# Patient Record
Sex: MALE | Race: WHITE | NOT HISPANIC OR LATINO | Employment: FULL TIME | URBAN - METROPOLITAN AREA
[De-identification: names, ages, dates, MRNs, and addresses within clinical notes are randomized per-mention and may not be internally consistent; named-entity substitution may affect disease eponyms.]

---

## 2019-03-22 ENCOUNTER — TRANSCRIBE ORDERS (OUTPATIENT)
Dept: ADMINISTRATIVE | Facility: HOSPITAL | Age: 59
End: 2019-03-22

## 2019-03-22 DIAGNOSIS — M47.817 LUMBOSACRAL SPONDYLOSIS WITHOUT MYELOPATHY: Primary | ICD-10-CM

## 2019-04-02 ENCOUNTER — HOSPITAL ENCOUNTER (OUTPATIENT)
Dept: MRI IMAGING | Facility: HOSPITAL | Age: 59
Discharge: HOME | End: 2019-04-02
Payer: COMMERCIAL

## 2019-04-02 DIAGNOSIS — M47.817 LUMBOSACRAL SPONDYLOSIS WITHOUT MYELOPATHY: ICD-10-CM

## 2019-04-02 PROCEDURE — 72148 MRI LUMBAR SPINE W/O DYE: CPT

## 2021-03-10 DIAGNOSIS — Z23 ENCOUNTER FOR IMMUNIZATION: ICD-10-CM

## 2022-09-23 PROBLEM — K62.5 RECTAL BLEEDING: Status: ACTIVE | Noted: 2022-09-23

## 2022-11-10 ENCOUNTER — HOSPITAL ENCOUNTER (OUTPATIENT)
Dept: ULTRASOUND IMAGING | Facility: HOSPITAL | Age: 62
End: 2022-11-10

## 2022-11-10 DIAGNOSIS — R19.00 ABDOMINAL MASS, UNSPECIFIED ABDOMINAL LOCATION: ICD-10-CM

## 2024-04-14 PROBLEM — G89.29 CHRONIC LOW BACK PAIN: Status: ACTIVE | Noted: 2018-04-02

## 2024-04-14 PROBLEM — M16.11 PRIMARY OSTEOARTHRITIS OF RIGHT HIP: Status: ACTIVE | Noted: 2020-09-25

## 2024-04-14 PROBLEM — R00.1 BRADYCARDIA: Status: ACTIVE | Noted: 2023-11-13

## 2024-04-14 PROBLEM — M48.061 SPINAL STENOSIS OF LUMBAR REGION: Status: ACTIVE | Noted: 2020-09-25

## 2024-04-14 PROBLEM — M19.90 ARTHRITIS: Status: ACTIVE | Noted: 2023-11-13

## 2024-04-14 PROBLEM — C61 MALIGNANT NEOPLASM OF PROSTATE (HCC): Status: ACTIVE | Noted: 2023-01-26

## 2024-04-14 PROBLEM — E78.1 HYPERTRIGLYCERIDEMIA: Status: ACTIVE | Noted: 2023-11-14

## 2024-04-14 PROBLEM — K21.9 CHRONIC GERD: Status: ACTIVE | Noted: 2024-01-23

## 2024-04-14 PROBLEM — H91.93 BILATERAL HEARING LOSS: Status: ACTIVE | Noted: 2023-11-14

## 2024-04-14 PROBLEM — M54.50 CHRONIC LOW BACK PAIN: Status: ACTIVE | Noted: 2018-04-02

## 2024-04-14 NOTE — PROGRESS NOTES
Cardio-Oncology / Heart Failure Cardiology Clinic Note    Jakub Molina 64 y.o. male   MRN: [id number]  Encounter: [id number]        Assessment / Plan:    # Cardio-Oncology Pertinent History  Prostate cancer  Dx 2022.  S/p brachytherapy and proton beam radiation  Previously on casodex  On lupron - plan for 2 years  (but pt choose to hold off last injection due to high BP)    # HTN  Appeared to get worse on lupron  BP today 140/80    Norvasc 5 QD    Losartan 50 BID    Toprol 75 QD -->  change to coreg 12.5 mg BID  Minimize NSAIDS  Low salt diet  Exercise  Weight loss    # HLD  Hypertriglyceridemia is on prob list  On gemfibrozil  Lipids 2022 -  .    TG 98.  ? Statin based on ASCVD risk score and based on need for ADT (wants to defer discussion to next visit)    #   bradycardia  EKG today - sinus at 54 bpm  Monitor on beta blocker    # High risk medication use  Lupron has been associated with potentially increased cardiovascular events (typically in patients with cardiac risk factors).  It is important to optimize all risk factors such as hypertension and hyperlipidemia.  Exercise has also been felt to be potentially beneficial in this setting.      Today's Plan Summary:  See above assessment/plan for full details of today's plan.  Briefly,     Switch metoprolol to coreg                Reason For Visit / Chief Complaint:  Recommended by [facility] for high BP on prostate cancer therapy    HPI:   Jakub Molina is a 64 y.o.  male with history as noted in the problem list and further detailed in the above assessment and plan.    Recommended by [facility] for high BP on prostate cancer therapy    As above, the patient has a history of hypertension and hypertriglyceridemia.  The patient was diagnosed with prostate cancer in 2022 treated with proton beam radiation and started on Lupron.  The patient has known hypertension and it got worse on Lupron.  He actually chose to hold off with the  last injection until his blood pressure could become better controlled.  His radiation oncologist recommended he see a cardio-oncologist.    Today, the patient reports -  no CP or SOB.   Occasional swelling in feet.   No orthopnea or PND.  No palpitations.  No syncope.   Not doing low salt diet.      Retired.   and .   .  Has step children.  Former smoker (quit in high school).    Rare ETOH (before cancer would drink 3 to 4 drinks per day).  Medical marijuana card.        Cardiac Imaging personally reviewed:  EKG 4-16-24  Sinus bradycardia at 54 bpm, otherwise normal   Holter or event monitor    Echo    MELITON    Cardiac MRI    Stress testing    Coronary CTA or [facility]    CPET            Patient Active Problem List    Diagnosis Date Noted   • Chronic GERD 01/23/2024   • Bilateral hearing loss 11/14/2023   • Hypertriglyceridemia 11/14/2023   • Arthritis 11/13/2023   • Bradycardia 11/13/2023   • Malignant neoplasm of prostate (HCC) 01/26/2023   • Rectal bleeding 09/23/2022   • Primary osteoarthritis of right hip 09/25/2020   • Spinal stenosis of lumbar region 09/25/2020   • Chronic low back pain 04/02/2018   • Primary hypertension 07/23/2012       Past Medical History:   Diagnosis Date   • Chronic GERD 01/23/2024   • Hypertriglyceridemia 11/14/2023    Formatting of this note is different from the original.   Lab Results    Component Value Date     CHOL 187 09/23/2022     HDL 43 09/23/2022     LDLCALC 124 09/23/2022     TRIG 98 09/23/2022       The 10-year ASCVD risk score (Sravan RUBIO, et al., 2019) is: 21.6%     Values used to calculate the score:       Age: 63 years       Sex: Male       Is Non- : No       Diabetic: No     • Malignant neoplasm of prostate (HCC) 01/26/2023    Last Assessment & Plan:    Formatting of this note might be different from the original.   Undergoing proton beam RT currently, completed HDR. Plan for 2 years ADT.      - Eligard 6mo  injection today   - RTC 6mo with PSA, T completed prior   • Primary hypertension 07/23/2012    Formatting of this note is different from the original.   BP Readings from Last 3 Encounters:    11/13/23 (!) 165/75    09/19/23 138/88    04/13/23 138/88       /75 by MA.  Repeat BP by me was 150/80.  Denies any cardiopulmonary symptoms.  BP is above goal.      Last Assessment & Plan:    Formatting of this note might be different from the original.    Will increase amlodipine from 2.5 mg to       Review of Systems   Constitutional:  Negative for chills and fever.   HENT:  Negative for nosebleeds.    Gastrointestinal:  Positive for abdominal pain. Negative for abdominal distention and blood in stool.   Endocrine: Positive for cold intolerance and heat intolerance.   Musculoskeletal:  Positive for back pain.   Neurological:  Positive for dizziness (if stands up quickly). Negative for syncope.   Psychiatric/Behavioral:  Negative for confusion.    14-point ROS completed and negative except as stated above and/or in the HPI.    Allergies   Allergen Reactions   • Morphine Anaphylaxis and Shortness Of Breath     Other reaction(s): cannot breath   • Fenofibrate Hives     Throat and lip swelling, sob       Current Outpatient Medications   Medication Instructions   • amLODIPine (NORVASC) 5 mg, Oral, Daily   • Ascorbic Acid (Vitamin C) (VITAMIN C) 100 mg, Oral, Daily   • calcium carbonate-vitamin D 500 mg-5 mcg per tablet 1 tablet, Oral, Daily with breakfast   • carvedilol (COREG) 12.5 mg, Oral, 2 times daily with meals   • celecoxib (CELEBREX) 200 mg, Oral, Daily   • gemfibrozil (LOPID) 600 mg, Oral, Daily   • losartan (COZAAR) 50 mg, Oral, 2 times daily   • methocarbamol (ROBAXIN) 750 mg, Oral, Every 6 hours PRN   • omeprazole (PRILOSEC) 40 mg, Oral, Daily   • traMADol (ULTRAM) 50 mg, Oral, Every 8 hours PRN       Social History     Socioeconomic History   • Marital status: /Civil Union     Spouse name: Not on file    • Number of children: Not on file   • Years of education: Not on file   • Highest education level: Not on file   Occupational History   • Not on file   Tobacco Use   • Smoking status: Never   • Smokeless tobacco: Not on file   Substance and Sexual Activity   • Alcohol use: Not on file   • Drug use: Not on file   • Sexual activity: Not on file   Other Topics Concern   • Not on file   Social History Narrative   • Not on file     Social Determinants of Health     Financial Resource Strain: Low Risk  (11/10/2023)    Received from [facility]    Overall Financial Resource Strain (CARDIA)    • Difficulty of Paying Living Expenses: Not hard at all   Food Insecurity: No Food Insecurity (11/10/2023)    Received from [facility]    Hunger Vital Sign    • Worried About Running Out of Food in the Last Year: Never true    • Ran Out of Food in the Last Year: Never true   Transportation Needs: No Transportation Needs (11/10/2023)    Received from [facility]    PRAPARE - Transportation    • Lack of Transportation (Medical): No    • Lack of Transportation (Non-Medical): No   Physical Activity: Not on file   Stress: Stress Concern Present (11/10/2023)    Received from [facility]    [place] [employer] - Occupational Stress Questionnaire    • Feeling of Stress : To some extent   Social Connections: Unknown (11/10/2023)    Received from [facility]    Social Connection and Isolation Panel [NHANES]    • Frequency of Communication with Friends and Family: Once a week    • Frequency of Social Gatherings with Friends and Family: Once a week    • Attends Faith Services: Never    • Active Member of Clubs or Organizations: No    • Attends Club or Organization Meetings: Never    • Marital Status: Not on file   Intimate Partner Violence: Not At Risk (11/10/2023)    Received from [facility]    Humiliation, Afraid,  "Rape, and Kick questionnaire    • Fear of Current or Ex-Partner: No    • Emotionally Abused: No    • Physically Abused: No    • Sexually Abused: No   Housing Stability: Low Risk  (11/10/2023)    Received from [facility]    Housing Stability Vital Sign    • Unable to Pay for Housing in the Last Year: No    • Number of Places Lived in the Last Year: 1    • Unstable Housing in the Last Year: No       Family History   Problem Relation Age of Onset   • Stroke Father    • Heart attack Father        Physical Exam:  Blood pressure 140/80, pulse (!) 54, height 5' 9\" (1.753 m), weight 105 kg (232 lb), SpO2 96%.  Body mass index is 34.26 kg/m².  Wt Readings from Last 3 Encounters:   04/16/24 105 kg (232 lb)   09/23/22 103 kg (228 lb)     Physical Exam  Vitals reviewed.   Constitutional:       General: He is not in acute distress.     Appearance: He is not toxic-appearing.   HENT:      Head: Normocephalic and atraumatic.   Eyes:      General: No scleral icterus.     Conjunctiva/sclera: Conjunctivae normal.   Neck:      Vascular: No carotid bruit.      Comments: No JVD  Cardiovascular:      Rate and Rhythm: Regular rhythm. Bradycardia present.      Heart sounds: No murmur heard.     No friction rub. No gallop.   Pulmonary:      Breath sounds: Normal breath sounds. No wheezing, rhonchi or rales.   Abdominal:      General: There is no distension.      Palpations: Abdomen is soft.      Tenderness: There is no abdominal tenderness. There is no guarding.   Musculoskeletal:      Right lower leg: No edema.      Left lower leg: No edema.   Skin:     Coloration: Skin is not jaundiced or pale.      Findings: No erythema.   Neurological:      Mental Status: He is alert. Mental status is at baseline.   Psychiatric:         Mood and Affect: Mood normal.         Behavior: Behavior normal.         Labs & Results:  Lab Results   Component Value Date    SODIUM 142 01/25/2024    K 4.2 01/25/2024     01/25/2024    CO2 29 " "01/25/2024    BUN 20 01/25/2024    CREATININE 0.86 01/25/2024    GLUC 92 01/25/2024    CALCIUM 10.1 01/25/2024     No results found for: \"NTBNP\"       Thank you for the opportunity to participate in the care of this patient.    Clay Dodge MD, [facility]  Staff Cardiologist  Director of Cardio-Oncology  [facility]  "

## 2024-04-16 ENCOUNTER — OFFICE VISIT (OUTPATIENT)
Dept: CARDIOLOGY CLINIC | Facility: CLINIC | Age: 64
End: 2024-04-16
Payer: COMMERCIAL

## 2024-04-16 VITALS
HEART RATE: 54 BPM | BODY MASS INDEX: 34.36 KG/M2 | OXYGEN SATURATION: 96 % | SYSTOLIC BLOOD PRESSURE: 140 MMHG | HEIGHT: 69 IN | DIASTOLIC BLOOD PRESSURE: 80 MMHG | WEIGHT: 232 LBS

## 2024-04-16 DIAGNOSIS — C61 MALIGNANT NEOPLASM OF PROSTATE (HCC): ICD-10-CM

## 2024-04-16 DIAGNOSIS — I10 PRIMARY HYPERTENSION: Primary | ICD-10-CM

## 2024-04-16 DIAGNOSIS — Z79.899 HIGH RISK MEDICATION USE: ICD-10-CM

## 2024-04-16 DIAGNOSIS — E78.1 HYPERTRIGLYCERIDEMIA: ICD-10-CM

## 2024-04-16 DIAGNOSIS — R00.1 BRADYCARDIA: ICD-10-CM

## 2024-04-16 PROCEDURE — 93000 ELECTROCARDIOGRAM COMPLETE: CPT | Performed by: INTERNAL MEDICINE

## 2024-04-16 PROCEDURE — 99204 OFFICE O/P NEW MOD 45 MIN: CPT | Performed by: INTERNAL MEDICINE

## 2024-04-16 RX ORDER — CELECOXIB 200 MG/1
200 CAPSULE ORAL DAILY
COMMUNITY

## 2024-04-16 RX ORDER — AMLODIPINE BESYLATE 5 MG/1
5 TABLET ORAL DAILY
COMMUNITY
Start: 2024-02-12

## 2024-04-16 RX ORDER — CARVEDILOL 12.5 MG/1
12.5 TABLET ORAL 2 TIMES DAILY WITH MEALS
Qty: 180 TABLET | Refills: 1 | Status: SHIPPED | OUTPATIENT
Start: 2024-04-16

## 2024-04-16 RX ORDER — B-COMPLEX WITH VITAMIN C
1 TABLET ORAL
COMMUNITY

## 2024-04-16 RX ORDER — METHOCARBAMOL 750 MG/1
750 TABLET, FILM COATED ORAL EVERY 6 HOURS PRN
COMMUNITY

## 2024-04-16 RX ORDER — OMEPRAZOLE 40 MG/1
40 CAPSULE, DELAYED RELEASE ORAL DAILY
COMMUNITY
Start: 2024-01-23 | End: 2024-04-22

## 2024-04-16 NOTE — PATIENT INSTRUCTIONS
Stop metoprolol  Start carvedilol which is slightly better for blood pressure reduction.  This is twice daily.  Work on low salt diet  Exercise

## 2024-05-28 ENCOUNTER — OFFICE VISIT (OUTPATIENT)
Dept: CARDIOLOGY CLINIC | Facility: CLINIC | Age: 64
End: 2024-05-28
Payer: COMMERCIAL

## 2024-05-28 VITALS
DIASTOLIC BLOOD PRESSURE: 86 MMHG | SYSTOLIC BLOOD PRESSURE: 126 MMHG | OXYGEN SATURATION: 97 % | HEART RATE: 58 BPM | WEIGHT: 234.5 LBS | HEIGHT: 69 IN | BODY MASS INDEX: 34.73 KG/M2

## 2024-05-28 DIAGNOSIS — I10 PRIMARY HYPERTENSION: ICD-10-CM

## 2024-05-28 DIAGNOSIS — E78.1 HYPERTRIGLYCERIDEMIA: Primary | ICD-10-CM

## 2024-05-28 DIAGNOSIS — Z79.899 HIGH RISK MEDICATION USE: ICD-10-CM

## 2024-05-28 DIAGNOSIS — R00.1 BRADYCARDIA: ICD-10-CM

## 2024-05-28 DIAGNOSIS — C61 MALIGNANT NEOPLASM OF PROSTATE (HCC): ICD-10-CM

## 2024-05-28 PROCEDURE — 99214 OFFICE O/P EST MOD 30 MIN: CPT | Performed by: INTERNAL MEDICINE

## 2024-05-28 NOTE — PROGRESS NOTES
Cardio-Oncology / Heart Failure Cardiology Clinic Note    Jakub Molina 64 y.o. male   MRN: [id number]  Encounter: [id number]        Assessment / Plan:    # Cardio-Oncology Pertinent History  Prostate cancer  Dx 2022.  S/p brachytherapy and proton beam radiation  Previously on casodex  On lupron -  plan for 2 years  (but pt choose to hold off last injection due to high BP.  BP now improved)    # HTN  Appeared to get worse on lupron      Norvasc 5 QD      Losartan 50 BID      Coreg 12.5 mg BID  (changed from metoprolol at first visit)  Minimize NSAIDS  Low salt diet - now doing  Exercise  Weight loss  BP today - improved.    # HLD  Hypertriglyceridemia is on prob list  On gemfibrozil  Lipids 2022 -  .    TG 98.  ? Statin based on ASCVD risk score and based on need for ADT (wants to defer statin at this time).    Agreeable to recheck lipids before next visit.    #   Sinus bradycardia  Asymptomatic  Monitor on beta blocker    # High risk medication use  Lupron has been associated with potentially increased cardiovascular events (typically in patients with cardiac risk factors).  It is important to optimize all risk factors such as hypertension and hyperlipidemia.  Exercise has also been felt to be potentially beneficial in this setting.      Today's Plan Summary:  See above assessment/plan for full details of today's plan.  Briefly,     Fasting lipids before next visit                Reason For Visit / Chief Complaint:  F/u -  HTN on prostate cancer therapy    HPI:   Jakub Molina is a 64 y.o.  male with history as noted in the problem list and further detailed in the above assessment and plan.    Initial:  April 2024  Recommended by [facility] for high BP on prostate cancer therapy  As above, the patient has a history of HTN, HLD.  The patient was diagnosed with prostate cancer in 2022 treated with proton beam radiation and started on Lupron.  The patient has known HTN and it got worse on Lupron.   He actually chose to hold off with the last injection until his blood pressure could become better controlled.  His radiation oncologist recommended he see a cardio-oncologist.  Today, the patient reports -  no CP or SOB.   Occasional swelling in feet.   Retired.   and .   .  Has step children.    Interval:  Plan at initial visit -->  Switch metoprolol to coreg    Today - BP has been better controlled on coreg.   Also cut back on salt.   Has not resumed lupron yet.   Having back issues.  No chest pain or SOB.             Cardiac Imaging personally reviewed:  EKG 4-16-24  Sinus bradycardia at 54 bpm, otherwise normal   Holter or event monitor    Echo    MELITON    Cardiac MRI    Stress testing    Coronary CTA or [facility]    CPET            Patient Active Problem List    Diagnosis Date Noted   • Chronic GERD 01/23/2024   • Bilateral hearing loss 11/14/2023   • Hypertriglyceridemia 11/14/2023   • Arthritis 11/13/2023   • Bradycardia 11/13/2023   • Malignant neoplasm of prostate (HCC) 01/26/2023   • Rectal bleeding 09/23/2022   • Primary osteoarthritis of right hip 09/25/2020   • Spinal stenosis of lumbar region 09/25/2020   • Chronic low back pain 04/02/2018   • Primary hypertension 07/23/2012       Past Medical History:   Diagnosis Date   • Chronic GERD 01/23/2024   • Hypertriglyceridemia 11/14/2023    Formatting of this note is different from the original.   Lab Results    Component Value Date     CHOL 187 09/23/2022     HDL 43 09/23/2022     LDLCALC 124 09/23/2022     TRIG 98 09/23/2022       The 10-year ASCVD risk score (Sravan RUBIO, et al., 2019) is: 21.6%     Values used to calculate the score:       Age: 63 years       Sex: Male       Is Non- : No       Diabetic: No     • Malignant neoplasm of prostate (HCC) 01/26/2023    Last Assessment & Plan:    Formatting of this note might be different from the original.   Undergoing proton beam RT currently, completed  HDR. Plan for 2 years ADT.      - Eligard 6mo injection today   - RTC 6mo with PSA, T completed prior   • Primary hypertension 07/23/2012    Formatting of this note is different from the original.   BP Readings from Last 3 Encounters:    11/13/23 (!) 165/75    09/19/23 138/88    04/13/23 138/88       /75 by MA.  Repeat BP by me was 150/80.  Denies any cardiopulmonary symptoms.  BP is above goal.      Last Assessment & Plan:    Formatting of this note might be different from the original.    Will increase amlodipine from 2.5 mg to       Allergies   Allergen Reactions   • Morphine Anaphylaxis and Shortness Of Breath     Other reaction(s): cannot breath   • Fenofibrate Hives     Throat and lip swelling, sob       Current Outpatient Medications   Medication Instructions   • amLODIPine (NORVASC) 5 mg, Oral, Daily   • Ascorbic Acid (Vitamin C) (VITAMIN C) 100 mg, Oral, Daily   • calcium carbonate-vitamin D 500 mg-5 mcg per tablet 1 tablet, Oral, Daily with breakfast   • carvedilol (COREG) 12.5 mg, Oral, 2 times daily with meals   • celecoxib (CELEBREX) 200 mg, Oral, Daily   • gemfibrozil (LOPID) 600 mg, Oral, Daily   • losartan (COZAAR) 50 mg, Oral, 2 times daily   • methocarbamol (ROBAXIN) 750 mg, Oral, Every 6 hours PRN   • omeprazole (PRILOSEC) 40 mg, Daily   • traMADol (ULTRAM) 50 mg, Oral, Every 8 hours PRN       Social History     Socioeconomic History   • Marital status: /Civil Union     Spouse name: Not on file   • Number of children: Not on file   • Years of education: Not on file   • Highest education level: Not on file   Occupational History   • Not on file   Tobacco Use   • Smoking status: Never   • Smokeless tobacco: Not on file   Substance and Sexual Activity   • Alcohol use: Not on file   • Drug use: Not on file   • Sexual activity: Not on file   Other Topics Concern   • Not on file   Social History Narrative   • Not on file     Social Determinants of Health     Financial Resource Strain: Low  Risk  (11/10/2023)    [facility]    Overall Financial Resource Strain (CARDIA)    • Difficulty of Paying Living Expenses: Not hard at all   Food Insecurity: No Food Insecurity (11/10/2023)    [facility]    Hunger Vital Sign    • Worried About Running Out of Food in the Last Year: Never true    • Ran Out of Food in the Last Year: Never true   Transportation Needs: No Transportation Needs (11/10/2023)    [facility]    PRAPARE - Transportation    • Lack of Transportation (Medical): No    • Lack of Transportation (Non-Medical): No   Physical Activity: Not on file   Stress: Stress Concern Present (11/10/2023)    [facility] from [facility]    [place] [employer] - Occupational Stress Questionnaire    • Feeling of Stress : To some extent   Social Connections: Unknown (11/10/2023)    [facility]    Social Connection and Isolation Panel [NHANES]    • Frequency of Communication with Friends and Family: Once a week    • Frequency of Social Gatherings with Friends and Family: Once a week    • Attends Sabianist Services: Never    • Active Member of Clubs or Organizations: No    • Attends Club or Organization Meetings: Never    • Marital Status: Not on file   Intimate Partner Violence: Not At Risk (11/10/2023)    [facility]    Humiliation, Afraid, Rape, and Kick questionnaire    • Fear of Current or Ex-Partner: No    • Emotionally Abused: No    • Physically Abused: No    • Sexually Abused: No   Housing Stability: Low Risk  (11/10/2023)    [facility]    Housing Stability Vital Sign    • Unable to Pay  "for Housing in the Last Year: No    • Number of Places Lived in the Last Year: 1    • Unstable Housing in the Last Year: No       Family History   Problem Relation Age of Onset   • Stroke Father    • Heart attack Father        Physical Exam:  Blood pressure 126/86, pulse 58, height 5' 9\" (1.753 m), weight 106 kg (234 lb 8 oz), SpO2 97%.  Body mass index is 34.63 kg/m².  Wt Readings from Last 3 Encounters:   05/28/24 106 kg (234 lb 8 oz)   04/16/24 105 kg (232 lb)   09/23/22 103 kg (228 lb)     Physical Exam  Vitals reviewed.   Constitutional:       General: He is not in acute distress.     Appearance: He is not toxic-appearing.   Neck:      Comments: JVP is not elevated  Cardiovascular:      Rate and Rhythm: Regular rhythm. Bradycardia present.      Heart sounds: No murmur heard.     No friction rub. No gallop.   Pulmonary:      Breath sounds: Normal breath sounds. No wheezing, rhonchi or rales.   Musculoskeletal:      Right lower leg: No edema.      Left lower leg: No edema.   Neurological:      Mental Status: He is alert.         Labs & Results:  Lab Results   Component Value Date    SODIUM 142 01/25/2024    K 4.2 01/25/2024     01/25/2024    CO2 29 01/25/2024    BUN 20 01/25/2024    CREATININE 0.86 01/25/2024    GLUC 92 01/25/2024    CALCIUM 10.1 01/25/2024     No results found for: \"NTBNP\"       Thank you for the opportunity to participate in the care of this patient.    Clay Dodge MD, [facility]  Staff Cardiologist  Director of Cardio-Oncology  [facility]  "

## 2024-10-10 DIAGNOSIS — I10 PRIMARY HYPERTENSION: ICD-10-CM

## 2024-10-10 RX ORDER — CARVEDILOL 12.5 MG/1
12.5 TABLET ORAL 2 TIMES DAILY WITH MEALS
Qty: 180 TABLET | Refills: 1 | Status: SHIPPED | OUTPATIENT
Start: 2024-10-10

## 2024-11-18 NOTE — PROGRESS NOTES
Cardio-Oncology / Heart Failure Cardiology Clinic Note    Jakub Molina 64 y.o. male   MRN: [id number]  Encounter: [id number]        Assessment / Plan:    # Cardio-Oncology Pertinent History  Prostate cancer  Dx 2022.  S/p brachytherapy and proton beam radiation  Previously on casodex  On lupron -  plan was for 2 years  (but pt choose to hold off last injection due to high BP.  BP now improved so recommend getting back with urology to discuss)    # HTN  Appeared to get worse on lupron        Norvasc 5 QD        Losartan 50 BID        Coreg 12.5 mg BID   Minimize NSAIDS  Low salt diet - now doing  Exercise  Weight loss  BP has improved    # HLD  Hypertriglyceridemia -->  on gemfibrozil  (allergy to fenofibrate)  Updated lipids -   .    .  ASCVD risk score - 16%.     Discussed statin.   Agreeable to trial low dose crestor 5mg QOD (max dose is 10mg for patients on gemfibrozil due to interaction).     #   Sinus bradycardia  Asymptomatic  Monitor on beta blocker    # High risk medication use  Lupron has been associated with potentially increased cardiovascular events (typically in patients with cardiac risk factors).  It is important to optimize all risk factors such as hypertension and hyperlipidemia.  Exercise has also been felt to be potentially beneficial in this setting and is recommended.      Today's Plan Summary:  See above assessment/plan for full details of today's plan.  Briefly,     Add low dose crestor   Fasting lipids before next visit                Reason For Visit / Chief Complaint:  F/u -  HTN, HLD on prostate cancer therapy    HPI:   Jakub Molina is a 64 y.o.  male with history as noted in the problem list and further detailed in the above assessment and plan.    Initial:  April 2024  Recommended by [facility] radiation oncology for high BP on prostate cancer therapy  As above, the patient has a history of HTN, HLD.  The patient was diagnosed with prostate cancer in 2022 treated with proton  beam radiation and started on Lupron.  The patient has known HTN and it got worse on Lupron.  He actually chose to hold off with the last injection until his blood pressure could become better controlled.  His radiation oncologist recommended he see a cardio-oncologist.  Today, the patient reports -  no CP or SOB.   Occasional swelling in feet.   Retired.   and .   .  Has step children.    Interval:  Last visit -->   BP has been better controlled on coreg.   Also cut back on salt.   Has not resumed lupron yet.     Plan last visit -->      Fasting lipids before next visit    Labs Aug 19 reviewed -  CMP normal.   .   .      Today  - feeling well from cardiac perspective.   No chest pain.   No SOB.   Occasional edema in right foot when standing a lot.   Not checking home blood pressures.            Cardiac Imaging personally reviewed:  EKG 4-16-24  Sinus bradycardia at 54 bpm, otherwise normal   Holter or event monitor    Echo    MEL[facility]ON    Cardiac MRI    Stress testing    Coronary CTA or [facility]    CPET            Patient Active Problem List    Diagnosis Date Noted    Chronic GERD 01/23/2024    Bilateral hearing loss 11/14/2023    Hypertriglyceridemia 11/14/2023    Arthritis 11/13/2023    Bradycardia 11/13/2023    Malignant neoplasm of prostate (HCC) 01/26/2023    Rectal bleeding 09/23/2022    Primary osteoarthritis of right hip 09/25/2020    Spinal stenosis of lumbar region 09/25/2020    Chronic low back pain 04/02/2018    Primary hypertension 07/23/2012       Past Medical History:   Diagnosis Date    Chronic GERD 01/23/2024    Hypertriglyceridemia 11/14/2023    Formatting of this note is different from the original.   Lab Results    Component Value Date     CHOL 187 09/23/2022     HDL 43 09/23/2022     LDLCALC 124 09/23/2022     TRIG 98 09/23/2022       The 10-year ASCVD risk score (Sravan RUBIO, et al., 2019) is: 21.6%     Values used to calculate the score:       Age: 63  years       Sex: Male       Is Non- : No       Diabetic: No      Malignant neoplasm of prostate (HCC) 01/26/2023    Last Assessment & Plan:    Formatting of this note might be different from the original.   Undergoing proton beam RT currently, completed HDR. Plan for 2 years ADT.      - Eligard 6mo injection today   - RTC 6mo with PSA, T completed prior    Primary hypertension 07/23/2012    Formatting of this note is different from the original.   BP Readings from Last 3 Encounters:    11/13/23 (!) 165/75    09/19/23 138/88    04/13/23 138/88       /75 by MA.  Repeat BP by me was 150/80.  Denies any cardiopulmonary symptoms.  BP is above goal.      Last Assessment & Plan:    Formatting of this note might be different from the original.    Will increase amlodipine from 2.5 mg to       Allergies   Allergen Reactions    Morphine Anaphylaxis and Shortness Of Breath     Other reaction(s): cannot breath    Fenofibrate Hives     Throat and lip swelling, sob       Current Outpatient Medications   Medication Instructions    amLODIPine (NORVASC) 5 mg, Daily    Ascorbic Acid (Vitamin C) (VITAMIN C) 100 mg, Daily    calcium carbonate-vitamin D 500 mg-5 mcg per tablet 1 tablet, Daily with breakfast    carvedilol (COREG) 12.5 mg, Oral, 2 times daily with meals    celecoxib (CELEBREX) 200 mg, Daily    gemfibrozil (LOPID) 600 mg, Daily    losartan (COZAAR) 50 mg, 2 times daily    methocarbamol (ROBAXIN) 750 mg, Every 6 hours PRN    rosuvastatin (CRESTOR) 5 mg, Oral, Every other day    traMADol (ULTRAM) 50 mg, Every 8 hours PRN       Social History     Socioeconomic History    Marital status: /Civil Union     Spouse name: Not on file    Number of children: Not on file    Years of education: Not on file    Highest education level: Not on file   Occupational History    Not on file   Tobacco Use    Smoking status: Never    Smokeless tobacco: Not on file   Substance and Sexual Activity    Alcohol  use: Not on file    Drug use: Not on file    Sexual activity: Not on file   Other Topics Concern    Not on file   Social History Narrative    Not on file     Social Drivers of Health     Financial Resource Strain: Low Risk  (11/10/2023)    [facility]    Overall Financial Resource Strain (CARDIA)     Difficulty of Paying Living Expenses: Not hard at all   Food Insecurity: No Food Insecurity (11/10/2023)    [facility]    Hunger Vital Sign     Worried About Running Out of Food in the Last Year: Never true     Ran Out of Food in the Last Year: Never true   Transportation Needs: No Transportation Needs (11/10/2023)    [facility]    PRAPARE - Transportation     Lack of Transportation (Medical): No     Lack of Transportation (Non-Medical): No   Physical Activity: Not on file   Stress: Stress Concern Present (11/10/2023)    [facility]    [employer] [place] [employer] - Occupational Stress Questionnaire     Feeling of Stress : To some extent   Social Connections: Unknown (11/10/2023)    [facility] from [facility]    Social Connection and Isolation Panel [NHANES]     Frequency of Communication with Friends and Family: Once a week     Frequency of Social Gatherings with Friends and Family: Once a week     Attends Jewish Services: Never     Active Member of Clubs or Organizations: No     Attends Club or Organization Meetings: Never     Marital Status: Not on file   Intimate Partner Violence: Not At Risk (11/10/2023)    [facility]    Humiliation, Afraid, Rape, and Kick questionnaire     Fear of Current or Ex-Partner: No     Emotionally Abused: No     Physically  "Abused: No     Sexually Abused: No   Housing Stability: Not on file       Family History   Problem Relation Age of Onset    Stroke Father     Heart attack Father        Physical Exam:  Blood pressure 130/84, pulse 56, height 5' 9\" (1.753 m), weight 102 kg (225 lb 8 oz), SpO2 97%.  Body mass index is 33.3 kg/m².  Wt Readings from Last 3 Encounters:   11/19/24 102 kg (225 lb 8 oz)   05/28/24 106 kg (234 lb 8 oz)   04/16/24 105 kg (232 lb)     Physical Exam  Vitals reviewed.   Constitutional:       General: He is not in acute distress.     Appearance: He is not toxic-appearing.   Neck:      Comments: No JVD   Cardiovascular:      Rate and Rhythm: Regular rhythm. Bradycardia present.      Heart sounds: No murmur heard.     No friction rub. No gallop.   Pulmonary:      Breath sounds: Normal breath sounds. No wheezing, rhonchi or rales.   Musculoskeletal:      Right lower leg: No edema.      Left lower leg: No edema.   Neurological:      Mental Status: He is alert.         Labs & Results:  Lab Results   Component Value Date    SODIUM 140 08/19/2024    K 4.9 08/19/2024     08/19/2024    CO2 26 08/19/2024    BUN 18 08/19/2024    CREATININE 0.77 08/19/2024    GLUC 101 (H) 08/19/2024    CALCIUM 9.5 08/19/2024     No results found for: \"NTBNP\"       Thank you for the opportunity to participate in the care of this patient.    Clay Dodge MD, [facility]  Staff Cardiologist  Director of Cardio-Oncology  [facility]  "

## 2024-11-19 ENCOUNTER — OFFICE VISIT (OUTPATIENT)
Dept: CARDIOLOGY CLINIC | Facility: CLINIC | Age: 64
End: 2024-11-19
Payer: MEDICARE

## 2024-11-19 VITALS
SYSTOLIC BLOOD PRESSURE: 130 MMHG | OXYGEN SATURATION: 97 % | HEIGHT: 69 IN | WEIGHT: 225.5 LBS | DIASTOLIC BLOOD PRESSURE: 84 MMHG | HEART RATE: 56 BPM | BODY MASS INDEX: 33.4 KG/M2

## 2024-11-19 DIAGNOSIS — E78.2 MIXED HYPERLIPIDEMIA: Primary | ICD-10-CM

## 2024-11-19 DIAGNOSIS — R00.1 BRADYCARDIA: ICD-10-CM

## 2024-11-19 DIAGNOSIS — E78.1 HYPERTRIGLYCERIDEMIA: ICD-10-CM

## 2024-11-19 DIAGNOSIS — I10 PRIMARY HYPERTENSION: ICD-10-CM

## 2024-11-19 DIAGNOSIS — C61 MALIGNANT NEOPLASM OF PROSTATE (HCC): ICD-10-CM

## 2024-11-19 PROCEDURE — 99214 OFFICE O/P EST MOD 30 MIN: CPT | Performed by: INTERNAL MEDICINE

## 2024-11-19 RX ORDER — ROSUVASTATIN CALCIUM 5 MG/1
5 TABLET, COATED ORAL EVERY OTHER DAY
Qty: 15 TABLET | Refills: 11 | Status: SHIPPED | OUTPATIENT
Start: 2024-11-19

## 2024-11-19 NOTE — PATIENT INSTRUCTIONS
Start a low dose medication for cholesterol called rosuvastatin.   Every other day.    Check fasting cholesterol before next visit.

## 2024-12-11 DIAGNOSIS — E78.2 MIXED HYPERLIPIDEMIA: ICD-10-CM

## 2024-12-12 RX ORDER — ROSUVASTATIN CALCIUM 5 MG/1
5 TABLET, COATED ORAL EVERY OTHER DAY
Qty: 45 TABLET | Refills: 1 | Status: SHIPPED | OUTPATIENT
Start: 2024-12-12

## 2025-04-03 DIAGNOSIS — I10 PRIMARY HYPERTENSION: ICD-10-CM

## 2025-04-03 RX ORDER — CARVEDILOL 12.5 MG/1
12.5 TABLET ORAL 2 TIMES DAILY WITH MEALS
Qty: 180 TABLET | Refills: 1 | Status: SHIPPED | OUTPATIENT
Start: 2025-04-03

## 2025-05-13 ENCOUNTER — TELEPHONE (OUTPATIENT)
Age: 65
End: 2025-05-13

## 2025-05-13 NOTE — TELEPHONE ENCOUNTER
LVOM for pt to remind him regarding pending lipid blood work. Pt instructed to call [phone] with any questions or concerns.

## 2025-05-19 NOTE — PROGRESS NOTES
Cardio-Oncology / Heart Failure Cardiology Clinic Note    Jakub Molina 65 y.o. male   MRN: [id number]  Encounter: [id number]        Assessment / Plan:    # Cardio-Oncology Pertinent History  Prostate cancer  Dx 2022.  S/p brachytherapy and proton beam radiation  Previously on casodex and lupron  Current -  observation     # HTN  Appeared to get worse on lupron, now off this          Norvasc 5 QD          Losartan 50 BID          Coreg 12.5 mg BID   Minimize NSAIDS  Low salt diet - now doing  Exercise  Weight loss  BP has improved but still above goal.  Increase norvasc to 10 mg QD    # HLD  Hypertriglyceridemia -->  on gemfibrozil  (allergy to fenofibrate)  ASCVD risk score - 16%.      Started crestor last visit.  Unfortunately couldn't tolerate due to nausea.  Recent updated lipids -  .   .  Multiple drug drug interactions with gemfibrozil  (cannot use most statins, cannot use zetia).  ? Ultimately lipid clinic.    #   Sinus bradycardia  Asymptomatic  Monitor on beta blocker        Today's Plan Summary:  See above assessment/plan for full details of today's plan.  Briefly,     BP has improved but still above goal.  Increase norvasc to 10 mg QD                Reason For Visit / Chief Complaint:  F/u -  HTN, HLD on prostate cancer therapy    HPI:   Jakub Molina is a 65 y.o.  male with history as noted in the problem list and further detailed in the above assessment and plan.    Initial:  April 2024  Recommended by [facility] oncology for high BP on prostate cancer therapy  As above, the patient has a history of HTN, HLD.  The patient was diagnosed with prostate cancer in 2022 treated with proton beam radiation and started on Lupron.  The patient has known HTN and it got worse on Lupron.  He actually chose to hold off with the last injection until his blood pressure could become better controlled.  His radiation oncologist recommended he see a cardio-oncologist.  Today, the patient reports -  no CP  or SOB.   Occasional swelling in feet.   Retired.   and .   .  Has step children.    Interval:  Last visit -->   no new sx's    Plan last visit -->      Add low dose crestor   Fasting lipids before next visit    Labs May 14 -   reviewed.  BMP - normal.  .   .    Today  -   stopped the crestor.  Reports it made him feel sick.  Caused nausea.   No CP or SOB.  No palpitations.  No syncope.               Cardiac Imaging personally reviewed:  EKG 4-16-24  Sinus bradycardia at 54 bpm, otherwise normal    5-20-25  Sinus bradycardia at 58 bpm, otherwise normal       Holter or event monitor    Echo    MELITON    Cardiac MRI    Stress testing    Coronary CTA or [facility]    CPET            Patient Active Problem List    Diagnosis Date Noted   • Mixed hyperlipidemia 05/20/2025   • Chronic GERD 01/23/2024   • Bilateral hearing loss 11/14/2023   • Hypertriglyceridemia 11/14/2023   • Arthritis 11/13/2023   • Bradycardia 11/13/2023   • Malignant neoplasm of prostate (HCC) 01/26/2023   • Rectal bleeding 09/23/2022   • Primary osteoarthritis of right hip 09/25/2020   • Spinal stenosis of lumbar region 09/25/2020   • Chronic low back pain 04/02/2018   • Primary hypertension 07/23/2012       Past Medical History:   Diagnosis Date   • Chronic GERD 01/23/2024   • GERD (gastroesophageal reflux disease)    • HL (hearing loss)    • Hypertriglyceridemia 11/14/2023    Formatting of this note is different from the original.   Lab Results    Component Value Date     CHOL 187 09/23/2022     HDL 43 09/23/2022     LDLCALC 124 09/23/2022     TRIG 98 09/23/2022       The 10-year ASCVD risk score (Sravan RUBIO, et al., 2019) is: 21.6%     Values used to calculate the score:       Age: 63 years       Sex: Male       Is Non- : No       Diabetic: No     • Malignant neoplasm of prostate (HCC) 01/26/2023    Last Assessment & Plan:    Formatting of this note might be different from the  original.   Undergoing proton beam RT currently, completed HDR. Plan for 2 years ADT.      - Eligard 6mo injection today   - RTC 6mo with PSA, T completed prior   • Primary hypertension 07/23/2012    Formatting of this note is different from the original.   BP Readings from Last 3 Encounters:    11/13/23 (!) 165/75    09/19/23 138/88    04/13/23 138/88       /75 by MA.  Repeat BP by me was 150/80.  Denies any cardiopulmonary symptoms.  BP is above goal.      Last Assessment & Plan:    Formatting of this note might be different from the original.    Will increase amlodipine from 2.5 mg to   • Tinnitus        Allergies   Allergen Reactions   • Morphine Anaphylaxis and Shortness Of Breath     Other reaction(s): cannot breath   • Fenofibrate Hives     Throat and lip swelling, sob       Current Outpatient Medications   Medication Instructions   • amLODIPine (NORVASC) 10 mg, Oral, Daily   • Ascorbic Acid (Vitamin C) (VITAMIN C) 100 mg, Daily   • calcium carbonate-vitamin D 500 mg-5 mcg per tablet 1 tablet, Daily with breakfast   • carvedilol (COREG) 12.5 mg, Oral, 2 times daily with meals   • celecoxib (CELEBREX) 200 mg, Daily   • gemfibrozil (LOPID) 600 mg, Daily   • losartan (COZAAR) 50 mg, 2 times daily   • methocarbamol (ROBAXIN) 750 mg, Every 6 hours PRN   • traMADol (ULTRAM) 50 mg, Every 8 hours PRN       Social History     Socioeconomic History   • Marital status: /Civil Union     Spouse name: Not on file   • Number of children: Not on file   • Years of education: Not on file   • Highest education level: Not on file   Occupational History   • Not on file   Tobacco Use   • Smoking status: Never   • Smokeless tobacco: Not on file   Substance and Sexual Activity   • Alcohol use: Not on file   • Drug use: Not on file   • Sexual activity: Not on file   Other Topics Concern   • Not on file   Social History Narrative   • Not on file     Social Drivers of Health     Financial Resource Strain: Low Risk   (1/5/2025)    Received from [facility]    Overall Financial Resource Strain (CARDIA)    • Difficulty of Paying Living Expenses: Not hard at all   Food Insecurity: No Food Insecurity (1/5/2025)    Received from [facility]    Hunger Vital Sign    • Within the past 12 months, you worried that your food would run out before you got the money to buy more.: Never true    • Within the past 12 months, the food you bought just didn't last and you didn't have money to get more.: Never true   Transportation Needs: No Transportation Needs (1/5/2025)    Received from [facility]    PRAPARE - Transportation    • Lack of Transportation (Medical): No    • Lack of Transportation (Non-Medical): No   Physical Activity: Not on file   Stress: Stress Concern Present (11/10/2023)    Received from [facility]    [place] [employer] - Occupational Stress Questionnaire    • Feeling of Stress : To some extent   Social Connections: Feeling Socially Integrated (1/5/2025)    Received from [facility]    OASIS : Social Isolation    • How often do you feel lonely or isolated from those around you?: Rarely   Intimate Partner Violence: Not At Risk (1/5/2025)    Received from [facility]    Humiliation, Afraid, Rape, and Kick questionnaire    • Within the last year, have you been afraid of your partner or ex-partner?: No    • Within the last year, have you been humiliated or emotionally abused in other ways by your partner or ex-partner?: No    • Within the last year, have you been kicked, hit, slapped, or otherwise physically hurt by your partner or ex-partner?: No    • Within the last year, have you been raped or forced to have any kind of sexual activity by your partner or ex-partner?: No   Housing Stability: Low Risk  (1/5/2025)    Received from [facility]    Housing Stability Vital Sign    • In the  "last 12 months, was there a time when you were not able to pay the mortgage or rent on time?: No    • In the past 12 months, how many times have you moved where you were living?: 0    • At any time in the past 12 months, were you homeless or living in a shelter (including now)?: No       Family History   Problem Relation Age of Onset   • Stroke Father    • Heart attack Father        Physical Exam:  Blood pressure 140/82, pulse 58, height 5' 9\" (1.753 m), weight 106 kg (234 lb), SpO2 96%.  Body mass index is 34.56 kg/m².  Wt Readings from Last 3 Encounters:   05/20/25 106 kg (234 lb)   01/22/25 102 kg (225 lb)   11/19/24 102 kg (225 lb 8 oz)     Physical Exam  Vitals reviewed.   Constitutional:       General: He is not in acute distress.     Appearance: He is not toxic-appearing.   Neck:      Comments: No JVD   Cardiovascular:      Rate and Rhythm: Regular rhythm. Bradycardia present.      Heart sounds: No murmur heard.     No friction rub. No gallop.   Pulmonary:      Breath sounds: Normal breath sounds. No wheezing, rhonchi or rales.     Musculoskeletal:      Comments: No LE edema     Neurological:      Mental Status: He is alert.         Labs & Results:  Lab Results   Component Value Date    SODIUM 140 01/28/2025    K 4.7 01/28/2025     01/28/2025    CO2 30 01/28/2025    BUN 21 01/28/2025    CREATININE 0.78 01/28/2025    GLUC 95 01/28/2025    CALCIUM 9.7 01/28/2025     No results found for: \"NTBNP\"       Thank you for the opportunity to participate in the care of this patient.    Clay Dodge MD, [facility]  Staff Cardiologist  Director of Cardio-Oncology  [facility]  "

## 2025-05-20 ENCOUNTER — OFFICE VISIT (OUTPATIENT)
Dept: CARDIOLOGY CLINIC | Facility: CLINIC | Age: 65
End: 2025-05-20
Payer: MEDICARE

## 2025-05-20 VITALS
SYSTOLIC BLOOD PRESSURE: 140 MMHG | OXYGEN SATURATION: 96 % | DIASTOLIC BLOOD PRESSURE: 82 MMHG | HEIGHT: 69 IN | WEIGHT: 234 LBS | BODY MASS INDEX: 34.66 KG/M2 | HEART RATE: 58 BPM

## 2025-05-20 DIAGNOSIS — E78.1 HYPERTRIGLYCERIDEMIA: ICD-10-CM

## 2025-05-20 DIAGNOSIS — I10 PRIMARY HYPERTENSION: Primary | ICD-10-CM

## 2025-05-20 DIAGNOSIS — E78.2 MIXED HYPERLIPIDEMIA: ICD-10-CM

## 2025-05-20 DIAGNOSIS — R00.1 BRADYCARDIA: ICD-10-CM

## 2025-05-20 PROCEDURE — 93000 ELECTROCARDIOGRAM COMPLETE: CPT | Performed by: INTERNAL MEDICINE

## 2025-05-20 PROCEDURE — 99214 OFFICE O/P EST MOD 30 MIN: CPT | Performed by: INTERNAL MEDICINE

## 2025-05-20 RX ORDER — AMLODIPINE BESYLATE 10 MG/1
10 TABLET ORAL DAILY
Qty: 90 TABLET | Refills: 3 | Status: SHIPPED | OUTPATIENT
Start: 2025-05-20